# Patient Record
Sex: MALE | Race: WHITE | NOT HISPANIC OR LATINO | Employment: OTHER | URBAN - METROPOLITAN AREA
[De-identification: names, ages, dates, MRNs, and addresses within clinical notes are randomized per-mention and may not be internally consistent; named-entity substitution may affect disease eponyms.]

---

## 2017-01-16 DIAGNOSIS — E53.8 DEFICIENCY OF OTHER SPECIFIED B GROUP VITAMINS: ICD-10-CM

## 2017-01-16 DIAGNOSIS — E03.9 HYPOTHYROIDISM: ICD-10-CM

## 2017-02-27 ENCOUNTER — GENERIC CONVERSION - ENCOUNTER (OUTPATIENT)
Dept: OTHER | Facility: OTHER | Age: 82
End: 2017-02-27

## 2017-09-27 ENCOUNTER — GENERIC CONVERSION - ENCOUNTER (OUTPATIENT)
Dept: OTHER | Facility: OTHER | Age: 82
End: 2017-09-27

## 2017-12-07 ENCOUNTER — ALLSCRIPTS OFFICE VISIT (OUTPATIENT)
Dept: OTHER | Facility: OTHER | Age: 82
End: 2017-12-07

## 2017-12-07 DIAGNOSIS — R09.89 OTHER SPECIFIED SYMPTOMS AND SIGNS INVOLVING THE CIRCULATORY AND RESPIRATORY SYSTEMS: ICD-10-CM

## 2017-12-07 DIAGNOSIS — I10 ESSENTIAL (PRIMARY) HYPERTENSION: ICD-10-CM

## 2017-12-07 DIAGNOSIS — I65.29 OCCLUSION AND STENOSIS OF UNSPECIFIED CAROTID ARTERY: ICD-10-CM

## 2017-12-07 DIAGNOSIS — R56.9 CONVULSIONS (HCC): ICD-10-CM

## 2017-12-07 DIAGNOSIS — M83.5 OTHER DRUG-INDUCED OSTEOMALACIA IN ADULTS: ICD-10-CM

## 2017-12-07 DIAGNOSIS — E55.9 VITAMIN D DEFICIENCY: ICD-10-CM

## 2017-12-11 NOTE — PROGRESS NOTES
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) (M83 5,T42 75XA)   2  Vitamin D deficiency (268 9) (E55 9)   3  Generalized convulsive seizure (780 39) (R56 9)   4  Left carotid bruit (785 9) (R09 89)   5  Occlusion and stenosis of carotid artery (433 10) (I65 29)    Plan  Anticonvulsant drug-induced osteomalacia, Generalized convulsive seizure,Hypertension, Vitamin D deficiency    · (1) LIPID PANEL FASTING W DIRECT LDL REFLEX; Status:Active; Requestedfor:29Xli6853;    Perform:[facility]; DRR:81FBH5942; Ordered;drug-induced osteomalacia, Generalized convulsive seizure, Hypertension, Vitamin D deficiency; Ordered By:Evens Carr; Anticonvulsant drug-induced osteomalacia, Generalized convulsive seizure, Vitamin Ddeficiency    · (1) CBC/ PLT (NO DIFF); Status:Active; Requested for:07Dec2017;    Perform:[facility]; NDO:[id number]; Ordered; For:Anticonvulsant drug-induced osteomalacia, Generalized convulsive seizure, Vitamin D deficiency; Ordered By:Evens Carr;   · (1) COMPREHENSIVE METABOLIC PANEL; Status:Active; Requested for:26Shm3976;    Perform:[facility]; AXH:89ZPV5482; Ordered; For:Anticonvulsant drug-induced osteomalacia, Generalized convulsive seizure, Vitamin D deficiency; Ordered By:Evens Carr;   · (1) PHENOBARBITAL; Status:Active; Requested for:99Cvn2316;    Perform:[facility]; PQD:68UHY9946; Ordered; For:Anticonvulsant drug-induced osteomalacia, Generalized convulsive seizure, Vitamin D deficiency; Ordered By:Evens Carr;   · (1) VITAMIN D 25-HYDROXY; Status:Active; Requested for:72Dtq9058;    Perform:[facility]; JVQ:48ICF7882; Ordered;drug-induced osteomalacia, Generalized convulsive seizure, Vitamin D deficiency; Ordered By:Evens Carr;   · Follow-up visit in 1 year Evaluation and Treatment  Follow-up  Status: Complete  Done:34Phd2917   Ordered; For: Anticonvulsant drug-induced osteomalacia, Generalized convulsive seizure, Vitamin D deficiency; Ordered By: Nallely Estrella Performed:  Due: 87UKQ7581; Last Updated By: Shirley Dolan; 12/7/2017 3:13:17 PM  Generalized convulsive seizure    · PHENobarbital 30 MG Oral Tablet; take 3 tablets by mouth at bedtime   Rx By: Nallely Estrella; Dispense: 90 Days ; #:270 Tablet; Refill: 1;For: Generalized convulsive seizure; DALIA = N; Print Rx  Generalized convulsive seizure, Headache    · Topiramate 25 MG Oral Tablet; TAKE 2 TABLET Bedtime   Rx By: Nallely Estrella; Dispense: 90 Days ; #:180 Tablet; Refill: 3;Generalized convulsive seizure, Headache; DALIA = N; Print Rx  Left carotid bruit, Occlusion and stenosis of carotid artery    · VAS CAROTID COMPLETE STUDY; SIDE:Bilateral; Status:Active; Requestedfor:82Vdx8981;    Perform:[facility]; Order Comments:81year old man with left carotid bruit with significant stenosis  ; JET:81RXY1821; Last Updated Alex Gorman; 12/7/2017 3:13:17 PM;Ordered;carotid bruit, Occlusion and stenosis of carotid artery; Ordered By:Gregory Carr;    Discussion/Summary  Discussion Summary:   Mr Andrew Galeas is a 81year old man who experienced a single life time seizure in an acute setting, but unclear circumstances that resulted in his seizure if there were any toxic exposure or provoking factor  Apparently, he was disoriented and agitated that required him to be in restraints  He was started on phenobarbital possibly due to the risk of recurrent seizure but I have no clear evidence or information that would predispose Mr Scarlett Sam to recurrent seizures  Mr Scarlett Sam has been very resistant to getting further medical testing done to clarify his risk of seizure recurrence or assist in the diagnosis of epilepsy, partly due to unrealistic fears of complications from the testing equipment  He also argues that he would refuse to wean off or discontinue medications out of the fear of having another seizure   Even though, his risk of recurrent seizure is less than 35% that is an unacceptable risk to him  Given that he has been on phenobarbital and topiramate for the past 10 years he feels that the side effects are tolerable and that he does not worry about the possibility of chronic effects on cognition, liver function, blood cells, and bone health  He declines on DEXA scan to assess for the health of his bone as he wants to stay on phenobarbital  I cannot recommend other antiepileptic mediations without knowing his risk of seizure recurrence  recommend against abruptly stopping his antiepileptic medication especially phenobarbital as it has a high risk of withdrawal related seizure  He knows the risk of bone health and has been taking vitamin D supplementation  recommend cerebrovascular risk reduction with aspirin, hypertension management to less than 130/90, LDL reduction to less than 70, and management of diabetes if present  He should continue with his antihypertensive medications, he may benefit from a cholesterol lowering agent, he will need to maintain an active lifestyle  Due to the presence of bilateral carotid bruits, I am recommending re-assessment of right carotid stenosis (which was previously estimated to be 70%), current he is asymptomatic   - continue with phenobarbital 90mg at bedtime- continue topiramate 50mg at bedtime- check phenobarbital level, vitamin D, CBC, CMP, fasting lipid profile- continue with ASA 81mg, see PCP about blood pressure and LDL risk profile  - continue with vitamin D supplementation 5000unit/mL take 1 mL daily- bilateral carotid dopplers for carotid bruits (will communicate findings by phone, if significantly stenotic will refer to vascular surgeon)  - follow-up in 12 months  you follow-up with your PCP in regards to your blood pressure and hypothyroidism  Counseling Documentation With Imm: The patient was counseled regarding impressions  The total amount of time spent with the patient was 42 minutes   More than 50% of this time was devoted to counseling and coordination of care  Issues addressed during this clinic visit included overall management, medication counseling or monitoring (including adverse effects, side effects and risks of antiepileptic medications) and stroke symptoms and actions to take for acute neurological deficits  Start: 2:18PM, Stop 3PM      Chief Complaint  Chief Complaint Free Text Note Form: Patient present for follow up appointment regarding medication refill to prevent seizures      History of Present Illness  Mr Asiya Colón is a 81year old right handed man here for follow-up evaluation of a single seizure, uncertain diagnosis of epilepsy and refill for phenobarbital and topiramate  Interval history 12/7/2017 Mr Augustine reports no issues; no seizure, episode of loss of awareness, or loss of consciousness  He has a headache every once in a while, mostly when people aggravates him  He continues to take vitamin D supplementation  He request checking vitamin D level, drug levels, and âroutine blood workâ  He was on Ramipril but has not had a refill for a long time  He was on his wifeâs insurance but she lost her job and they lost insurance so they did not have routine doctor visits  He continues to decline medical testing  AED/side effects/compliance: Phenobarbital 30mg 3 tablets at bedtime Topiramate 50mg at bedtime No side effects No missed doses HPI: Initial History 11/7/2014Davide has had only one lifetime seizure around 2007  He is unable to give me exact details that resulted in the seizure  Apparently, prior to having the seizure he was feeling very bad/weak, his pulmonologist had him go to the hospital and was told that he was severely anemic  When he was in the hospital, he âwent nutsâ, he needed to be put in restraints, he was hospitalized for a week, he even had a lumbar puncture  He was told that his B12 was low and required monthly injections   He never had an idea why he cannot absorb B12  On the day that he was being discharged he had a seizure, his ankles were also swollen, but he does not remember the seizure  He also had severe headaches after the seizure  He never had headaches before the hospitalization  He was in [facility] in 2007  He was immediately started on phenobarbital for his seizure and topiramate for his headaches  Since then he has not had another seizure  was taking Xanax for anxiety, started after his seizure, in 2007  His last alcohol drink was 20 â 30 years ago  He has no prior history of seizures or events of confusion, zoning out, behavioral arrest   has difficulty falling asleep, toss and turn, and takes temazepam 30mg but drug plan reduced it to 7 5mg for sleep  Princess Galvin had requested an EEG and MRI brain study to verify Mr Boateng underlying cause of seizure  However, Mr Maria Del Rosario Pedro refused to undergo these studies  He felt like he was held hostage by not being able to get his medication refilled  His primary care doctor did not want to refill the medication unless he was evaluated by his neurologist  Dr Princess Galvin wanted Mr Maria Del Rosario Pedro evaluated by a specialist before any more refills will be given  Mr Maria Del Rosario Pedro is concerned about having an EEG due to a friend of his who went nuts after apparently having an EEG study  He states that he has had an MRI study of another body region in the past   of 2015-2016[place] continues to have a fear of medical tests including EEGs and MRI with dye; refuses all testing even DEXA  He is afraid that the EEG will cause him to have a seizure or take over his body  He is petrified of any electrical equipment on his head  He has not had a seizure since 2007  denies myoclonic jerking, altered mental status, disorient/confusion, convulsion, epigastric rising, or impending doom sensations  There are no headaches ever since he was started on topiramate  He has been taking vitamin D3 supplements   He feels that he is doing well and if it aidi't broke don't fix it   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 he is on antiepileptic medications he cannot get a CDL license so he put it on hold  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  He intermittently get anxiety attacks when he gets worked up     semiology:Single seizure that he cannot remember details possibly a convulsive seizure  Featuresepilepticus: NoInjury Seizures: NoFactors: None  Risk Factors:pregnancy: Nobirth/: NoDevelopment: Noseizures, simple: Noseizures, complex: Noinfection: Noretardation: Nopalsy: Noinjury (moderate/severe): Noneoplasm: Nomalformation: Noprocedure: NoNoabuse: Noabuse: Nohistory Sz/epilepsy: No  AEDs:     Review of Systems  A review of 12 organ systems was completed and all systems were negative except for what is detailed in the HPI and noted below Neurological ROS:  HEENT: dryness of the eyes-- and-- hearing loss  Respiratory: shortness of breath with or without exertion  Psychiatric: anxiety  Active Problems  1  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,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) (M83 5,T42 75XA)   2  Cataract of both eyes (366 9) (H26 9)   3  COPD exacerbation (491 21) (J44 1)   4  Generalized convulsive seizure (780 39) (R56 9)   5  GERD (gastroesophageal reflux disease) (530 81) (K21 9)   6  Headache (784 0) (R51)   7  Hypertension (401 9) (I10)   8  Hypothyroidism (244 9) (E03 9)   9  Insomnia (780 52) (G47 00)   10  Left carotid bruit (785 9) (R09 89)   11  Occlusion and stenosis of carotid artery (433 10) (I65 29)   12  Vitamin B12 deficiency (266 2) (E53 8)   13  Vitamin D deficiency (268 9) (E55 9)    Past Medical History  1  History of Anxiety (300 00) (F41 9)   2  History of High blood pressure (401 9) (I10)   3  History of Lung disease (518 89) (J98 4)   4  History of Thyroid disorder (246 9) (E07 9)  Active Problems And Past Medical History Reviewed:    The active problems and past medical history were reviewed and updated today  Surgical History    1  History of Hernia Repair   2  History of Tonsillectomy   3  History of Transurethral Resection Of Prostate (TURP)    Family History  Mother    1  Family history of Benign essential hypertension  Father    2  Family history of Pacemaker Placement   3  Family history of Peripheral arterial disease  Family History Reviewed: The family history was reviewed and updated today  There is no family history of seizure, epilepsy or developmental delay  Father may have been demented      Social History     · Former smoker (V14 24) (W01 741)   ·    · No alcohol use   · No drug use   · Retired  Social History Reviewed: The social history was reviewed and is unchanged  Living situation:  lives with wife Tobacco:  stopped smoking 20-30 years ago  Alcohol:  No alcohol use for the 30 years ago Drugs:  No illegal drug use Work:  retired, , 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 Driving:  Yes      Current Meds   1  AmLODIPine Besylate 5 MG Oral Tablet; take 1 tablet by mouth daily; Therapy: 53Jui5921 to (Evaluate:10Nov2017)  Requested for: 73URK8143; Last Rx:12Ctk2919 Ordered   2  Aspirin 81 MG TABS; TAKE 1 TABLET DAILY; Therapy: (Recorded:07Nov2014) to Recorded   3  Cyanocobalamin 1000 MCG/ML Injection Solution; INJECT 1 ML INTRAMUSCULARLY ONCE A MONTH; Therapy: 67YJS6428 to (Evaluate:15Mar2017)  Requested for: 87HVY5045; Last Rx:91Cvs3319 Ordered   4  Flonase 50 MCG/ACT SUSP; USE 1 SPRAY IN EACH NOSTRIL ONCE DAILY; Therapy: (Recorded:07Nov2014) to Recorded   5  Levothyroxine Sodium 88 MCG Oral Tablet; take 1 tablet every day; Therapy: 07Xzk2434 to (Dhara Miranda)  Requested for: 04AIY6156; Last Rx:68Ncf9567 Ordered   6  Omeprazole 20 MG Oral Capsule Delayed Release; TAKE ONE CAPSULE BY MOUTH TWICE A DAY; Therapy: 38Uhn3785 to (Evaluate:10Nov2017)  Requested for: 81HME8328; Last Rx:15Qot6123 Ordered   7   PHENobarbital 30 MG Oral Tablet; take 3 tablets by mouth at bedtime; Therapy: 35Eop3357 to (Renew:98Lvr9023)  Requested for: 15Csd2538; Last Rx:35Xud3514 Ordered   8  Ramipril 10 MG Oral Capsule; TAKE ONE CAPSULE BY MOUTH EVERY DAY; Therapy: 63OCG1655 to (Evaluate:42Htb3200)  Requested for: 10KKB7328; Last Rx:67Esd4968 Ordered   9  Topiramate 25 MG Oral Tablet; TAKE 2 TABLET Bedtime; Therapy: 19TQD0055 to (Evaluate:87Iuy2382); Last Rx:79Jmw1143 Ordered   10  Ventolin  (90 Base) MCG/ACT Inhalation Aerosol Solution; Therapy: 86Sfr4580 to (Evaluate:44Zsn8145) Recorded   11  Vitamin D3 5000 UNIT/ML Oral Liquid; TAKE 1 ML Daily; Therapy: 71GSP9341 to (Last Rx:69Odm3746) Ordered  Medication List Reviewed: The medication list was reviewed and updated today  Allergies    1  No Known Drug Allergies    Vitals  Signs   Recorded: 21EUJ6305 02:10PM   Heart Rate: 84  Systolic: 753, LUE, Sitting  Diastolic: 78, LUE, Sitting  Height: 5 ft 8 in  Weight: 123 lb 2 oz  BMI Calculated: 18 72  BSA Calculated: 1 66  O2 Saturation: 97    Physical Exam  GENERAL:  normally developed person in no acute distress, atraumatic head, he has an extensive white beard with rubber bands ties Carotids: bilateral bruits are heard Eyes: Anicteric Heart: Regular rate and rhythm Chest: clear to auscultation  MENTAL STATUS Orientation: Alert and oriented x 3 Fund of knowledge: Intact  Attention/concentration: Intact Recent/remote memory: Intact Language: speech is fluent, no aphasia  OPHTHALMOSCOPIC Fundus/Optic discs/Posterior segments: N/a  CRANIAL NERVES II: PERRL  III, IV, VI: Extraocular movements intact  No nystagmus  V: N/A VII: Facial movements normal and symmetric VIII: n/a IX, X: no dysarthria XI: Intact trapezius, SCM strength XII: Tongue protrudes to the midline  MOTOR (Upper and lower extremities)  Bulk/tone/abnormal movement: Normal muscle bulk and tone  Drift: No pronator drift  Strength: Strength 5/5 throughout      COORDINATION  F/N: normal FFM: normal CEZAR: normal Station/Gait: Normal  SENSORY Light touch: normal  Reflexes: n/a      Results/Data  Diagnostic Studies Reviewed:  MRI Review Refuses to have MRI brain completedto have EEG completedto have DEXA scan completed  Ultrasound 10/29/2014dopplersâ moderate to sever atherosclerotic plaque in the right bulbâ mild calcific plaquing in the left ICAvertebral arteries show antegrade flow  Diagnostic Review Labs:141/3 9/106/26/21/0 53/837 1/3 8/0 2/16/19/519640 074112 4/13 7/42/252    D 3413 5        Signatures   Electronically signed by : MANI Sheehan ; Dec 10 2017  5:14PM EST                       (Author)

## 2018-01-17 NOTE — RESULT NOTES
Verified Results  *VB - Urinary Incontinence Screen (Dx Z13 89 Screen for UI) 15ZNQ7443 03:03PM Ad Capes     Test Name Result Flag Reference   Urinary Incontinence Assessment      pt has no urinary incontinence                       *VB - Fall Risk Assessment  (Dx Z13 89 Screen for Neurologic Disorder) 38GVN0444 03:03PM Ad Capes     Test Name Result Flag Reference   Falls Risk Not Done      Patient not ambulatory

## 2018-01-22 VITALS
HEIGHT: 68 IN | OXYGEN SATURATION: 97 % | DIASTOLIC BLOOD PRESSURE: 78 MMHG | WEIGHT: 123.13 LBS | HEART RATE: 84 BPM | SYSTOLIC BLOOD PRESSURE: 128 MMHG | BODY MASS INDEX: 18.66 KG/M2

## 2018-08-29 DIAGNOSIS — R56.9 GENERALIZED CONVULSIVE SEIZURE (HCC): Primary | ICD-10-CM

## 2018-08-29 RX ORDER — PHENOBARBITAL 30 MG/1
90 TABLET ORAL
Qty: 270 TABLET | Refills: 1 | Status: SHIPPED | OUTPATIENT
Start: 2018-08-29 | End: 2018-12-13 | Stop reason: SDUPTHER

## 2018-08-29 RX ORDER — TOPIRAMATE 25 MG/1
2 TABLET ORAL
COMMUNITY
Start: 2016-12-06 | End: 2018-08-29 | Stop reason: SDUPTHER

## 2018-08-29 RX ORDER — PHENOBARBITAL 30 MG/1
3 TABLET ORAL
COMMUNITY
Start: 2017-08-30 | End: 2018-08-29 | Stop reason: SDUPTHER

## 2018-08-29 RX ORDER — TOPIRAMATE 25 MG/1
50 TABLET ORAL
Qty: 180 TABLET | Refills: 3 | Status: SHIPPED | OUTPATIENT
Start: 2018-08-29 | End: 2018-12-13 | Stop reason: SDUPTHER

## 2018-08-29 NOTE — TELEPHONE ENCOUNTER
Pt requests that scripts be printed and mailed to him as he sends to a mail order      Please mail when signed

## 2018-12-13 ENCOUNTER — OFFICE VISIT (OUTPATIENT)
Dept: NEUROLOGY | Facility: CLINIC | Age: 83
End: 2018-12-13
Payer: MEDICARE

## 2018-12-13 ENCOUNTER — TELEPHONE (OUTPATIENT)
Dept: NEUROLOGY | Facility: CLINIC | Age: 83
End: 2018-12-13

## 2018-12-13 VITALS
DIASTOLIC BLOOD PRESSURE: 78 MMHG | WEIGHT: 118.1 LBS | HEIGHT: 68 IN | BODY MASS INDEX: 17.9 KG/M2 | HEART RATE: 66 BPM | SYSTOLIC BLOOD PRESSURE: 126 MMHG

## 2018-12-13 DIAGNOSIS — T42.75XA ANTICONVULSANT DRUG-INDUCED OSTEOMALACIA: ICD-10-CM

## 2018-12-13 DIAGNOSIS — I65.23 CAROTID ARTERY STENOSIS, ASYMPTOMATIC, BILATERAL: ICD-10-CM

## 2018-12-13 DIAGNOSIS — R51.9 NONINTRACTABLE EPISODIC HEADACHE, UNSPECIFIED HEADACHE TYPE: ICD-10-CM

## 2018-12-13 DIAGNOSIS — M83.5 ANTICONVULSANT DRUG-INDUCED OSTEOMALACIA: ICD-10-CM

## 2018-12-13 DIAGNOSIS — R56.9 GENERALIZED CONVULSIVE SEIZURE (HCC): Primary | ICD-10-CM

## 2018-12-13 PROBLEM — J43.1 PANLOBULAR EMPHYSEMA (HCC): Status: ACTIVE | Noted: 2017-12-21

## 2018-12-13 PROCEDURE — 99214 OFFICE O/P EST MOD 30 MIN: CPT | Performed by: PSYCHIATRY & NEUROLOGY

## 2018-12-13 RX ORDER — FLUTICASONE PROPIONATE 50 MCG
SPRAY, SUSPENSION (ML) NASAL
COMMUNITY
Start: 2015-10-23

## 2018-12-13 RX ORDER — INFLUENZA A VIRUSA/MICHIGAN/45/2015 X-275 (H1N1) ANTIGEN (FORMALDEHYDE INACTIVATED), INFLUENZA A VIRUS A/HONG KONG/4801/2014 X-263B (H3N2) ANTIGEN (FORMALDEHYDE INACTIVATED), AND INFLUENZA B VIRUS B/BRISBANE/60/2008 ANTIGEN (FORMALDEHYDE INACTIVATED) 60; 60; 60 UG/.5ML; UG/.5ML; UG/.5ML
INJECTION, SUSPENSION INTRAMUSCULAR
Refills: 0 | COMMUNITY
Start: 2018-10-11

## 2018-12-13 RX ORDER — LEVOTHYROXINE SODIUM 88 UG/1
1 TABLET ORAL DAILY
COMMUNITY
Start: 2014-09-18

## 2018-12-13 RX ORDER — RAMIPRIL 10 MG/1
1 CAPSULE ORAL DAILY
COMMUNITY
Start: 2014-10-09

## 2018-12-13 RX ORDER — TOPIRAMATE 25 MG/1
50 TABLET ORAL
Qty: 180 TABLET | Refills: 3 | Status: SHIPPED | OUTPATIENT
Start: 2018-12-13 | End: 2019-08-28 | Stop reason: SDUPTHER

## 2018-12-13 RX ORDER — AMLODIPINE BESYLATE 5 MG/1
1 TABLET ORAL DAILY
COMMUNITY
Start: 2014-09-17

## 2018-12-13 RX ORDER — PHENOBARBITAL 30 MG/1
90 TABLET ORAL
Qty: 270 TABLET | Refills: 3 | Status: SHIPPED | OUTPATIENT
Start: 2018-12-13 | End: 2019-03-03 | Stop reason: SDUPTHER

## 2018-12-13 RX ORDER — ALBUTEROL SULFATE 90 UG/1
4 AEROSOL, METERED RESPIRATORY (INHALATION) EVERY 6 HOURS PRN
COMMUNITY
Start: 2014-04-25

## 2018-12-13 NOTE — PROGRESS NOTES
Review of Systems   Constitutional: Negative  Negative for appetite change and fever  HENT: Positive for hearing loss  Negative for tinnitus, trouble swallowing and voice change  Eyes: Negative  Negative for photophobia and pain  Respiratory: Negative  Negative for shortness of breath  Cardiovascular: Negative  Negative for palpitations  Gastrointestinal: Positive for abdominal pain  Negative for nausea and vomiting  Endocrine: Negative  Negative for cold intolerance and heat intolerance  Genitourinary: Negative  Negative for dysuria, frequency and urgency  Musculoskeletal: Positive for myalgias (due to hernia)  Negative for neck pain  Skin: Negative  Negative for rash  Neurological: Negative  Negative for dizziness, tremors, seizures, syncope, facial asymmetry, speech difficulty, weakness, light-headedness, numbness and headaches  Hematological: Negative  Does not bruise/bleed easily  Psychiatric/Behavioral: Negative  Negative for confusion, hallucinations and sleep disturbance

## 2018-12-13 NOTE — LETTER
2018     Adina Healy MD  [address]  [address]    Patient: Jaun Duckworth   YOB: 1935   Date of Visit: 2018       Dear Dr Bel Barrios: Thank you for referring Haley Campo to me for evaluation  Below are my notes for this consultation  If you have questions, please do not hesitate to call me  I look forward to following your patient along with you  Sincerely,        Sully Gomez MD        CC: No Recipients  Sully Gomez MD  2018  3:45 PM  Sign at close encounter  [facility]  Patient's Name: Jaun Duckworth   Patient's : 1935   Visit Type: follow-up  Referring MD / PCP:  Adina Healy MD     Assessment:  Mr Haley Campo is a 81 y o  man who experienced a single life time seizure in an acute setting, but unclear circumstances that resulted in his seizure if there were any toxic exposure or provoking factor  It was severe enough that he was started on phenobarbital   He has refused to undergo medical testing to determine if he has an inherent risk for recurrent seizures (EEG and MRI brain study)  He also is concerned that should he stop phenobarbital that he will experience a recurrent seizure  He cannot afford to lose his 's license [place] and needs to be able to transport his wife for dialysis  We agreed to continue with phenobarbital to prevent seizures (even though an inherent risk is not clearly defined)  He continues with topiramate because he had intractable migraines at some point and that topiramate has prevented recurrence of migraine headaches  He is aware of the chronic side effects of these medications including osteopenia/osteomalacia, he has refused DEXA scanning and blood work      Mr Claude Meyer has been very resistant to getting further medical testing done to clarify his risk of seizure recurrence or assist in the diagnosis of epilepsy, partly due to unrealistic fears of complications from the testing equipment  He also argues that he would refuse to wean off or discontinue medications out of the fear of having another seizure  Even though, his risk of recurrent seizure is less than 35% that is an unacceptable risk to him  I cannot recommend other antiepileptic mediations without knowing his risk of seizure recurrence  I recommend against abruptly stopping his antiepileptic medication especially phenobarbital as it has a high risk of withdrawal related seizure  He knows the risk of bone health and has been taking vitamin D supplementation  Due to the presence of bilateral carotid bruits, I am recommending re-assessment of right carotid stenosis (which was previously estimated to be 70%), current he is asymptomatic  Plan:   - continue with phenobarbital 90mg at bedtime  - continue topiramate 50mg at bedtime  - check phenobarbital level, vitamin D, CBC, CMP, fasting lipid profile  - continue with ASA 81mg, see PCP about blood pressure and LDL risk profile  - continue with vitamin D supplementation 5000unit/mL take 1 mL daily  - bilateral carotid dopplers for carotid bruits (will communicate findings by phone, if significantly stenotic will refer to vascular surgeon)  - follow-up in 12 months    Problem List Items Addressed This Visit        Cardiovascular and Mediastinum    Carotid artery stenosis, asymptomatic, bilateral       Musculoskeletal and Integument    Anticonvulsant drug-induced osteomalacia       Other    Generalized convulsive seizure ([facility] ) - Primary    Headache        Chief Complaint:    Chief Complaint   Patient presents with    Seizures        HPI:    Paul Fenton is a 81 y o  right handed male here for follow-up evaluation of unclear diagnosis of underlying seizure disorder  Interval History 12/13/2018  He reports feeling well, there have been no recurrent seizures, no convulsion, or unusual behavior    He denies recurrent headaches  He misplaced his requisition for blood work that was ordered a year ago  He has to drive his wife to her dialysis and does not want to change is medication because he does not want to risk a seizure and lose his 's license  He has no social security, no pension, and no other way of getting his wife to dialysis without him being able to drive  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, just local area, job site to job site, transporting "earth movers"  He has a federal and state CDL  He has COPD  AED/side effects/compliance:  Phenobarbital 30mg 3 tablets at bedtime  Topiramate 50mg at bedtime  No side effects  No missed doses    Seizure semiology:  1  Single seizure that he cannot remember details possibly a convulsive seizure    Prior Epilepsy History:  Intake history 11/7/2014  Mr Augustine has had only one lifetime seizure around 2007  He is unable to give me exact details that resulted in the seizure  Apparently, prior to having the seizure he was feeling very bad/weak, his pulmonologist had him go to the hospital and was told that he was severely anemic  When he was in the hospital, he went nuts, he needed to be put in restraints, he was hospitalized for a week, he even had a lumbar puncture  He was told that his B12 was low and required monthly injections  He never had an idea why he cannot absorb B12  On the day that he was being discharged he had a seizure, his ankles were also swollen, but he does not remember the seizure  He also had severe headaches after the seizure  He never had headaches before the hospitalization  He was in [facility] in 2007  He was immediately started on phenobarbital for his seizure and topiramate for his headaches  Since then he has not had another seizure  He also takes Xanax for anxiety  He had started Xanax after his seizure in 2007  Currently, he is taking Xanax about 1-2 times a day but not every single day for anxiety  His last alcohol drink was 20  30 years ago  He has no prior history of seizures or events of confusion, zoning out, behavioral arrest     He has difficulty falling asleep, toss and turn, and takes temazepam 30mg but drug plan reduced it to 7 5mg for sleep  Dr Ivan Delgadillo had requested an EEG and MRI brain study to verify Mr Elton Currie underlying cause of seizure  However, Mr Mis Fink refused to undergo these studies  He felt like he was held hostage by not being able to get his medication refilled  His primary care doctor did not want to refill the medication unless he was evaluated by his neurologist   Dr Ivan Delgadillo wanted Mr Mis Fink evaluated by a specialist before any more refills will be given  Mr Mis Fink is concerned about having an EEG due to a friend of his who went nuts after apparently having an EEG study  He states that he has had an MRI study of another body region in the past     Mr Mis Fink depends on his [email or web address]  license to support his income  Summary [id number]  Mr Augustine continues to have a fear of medical tests including EEGs and MRI with dye  He is afraid that the EEG will cause him to have a seizure or take over his body  He is petrified of any electrical equipment on his head  He does not even want to have a DEXA scan  He denies myoclonic jerking, altered mental status, disorient/confusion, convulsion, epigastric rising, or impending doom sensations  There are no headaches ever since he was started on topiramate  He is too concerned about having a seizure and wishes to stay on both phenobarbital and topiramate, concerned that by weaning off of these medications he will have a seizure  His CDL license is on hold, he is not driving his tractor trailer because of the rule about not being on antiseizure medication      Special Features  Status epilepticus: No  Self Injury Seizures: No  Precipitating Factors: None    Epilepsy Risk Factors:  Abnormal pregnancy: No  Abnormal birth/: No  Abnormal Development: No  Febrile seizures, simple: No  Febrile seizures, complex: No  CNS infection: No  Mental retardation: No  Cerebral palsy: No  Head injury (moderate/severe): No  CNS neoplasm: No  CNS malformation: No  Neurosurgical procedure: No  Stroke: No  Alcohol abuse: No  Drug abuse: No  Family history Sz/epilepsy: No    Prior AEDs:  medication Max dose Time used Reason to stop   phenobarbital      topiramate        Prior workup:  x   Imaging:  He refuses brain imaging study    9/13/2016  [facility] - Carotid duplex study  50-69% right ICA artery stenosis  20-49% left ICA stenosis  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    EEGs:  He refuses EEG study    Labs:  No labs available for last 2 years      General exam   /78 (BP Location: Left arm, Patient Position: Sitting, Cuff Size: Adult)   Pulse 66   Ht 5' 8" (1 727 m)   Wt 53 6 kg (118 lb 1 6 oz)   BMI 17 96 kg/m²     Appearance: elderly man, normally developed, there is temporalis muscle atrophy, he coughs frequently during the office visit  Carotids: left carotid bruit is present  Cardiovascular: difficult to hear heart sounds  Pulmonary: clear to auscultation    HEENT: anicteric and moist mucus membranes / oral cavity   Fundoscopy: not assessed    Mental status  Orientation: alert and oriented to name, place, time  Fund of Knowledge: intact   Attention and Concentration: able to spell HOUSE forwards and backwards  Current and Remote Memory:intact  Language: spontaneous speech is normal and comprehension is intact    Cranial Nerves  CN 1: not tested  CN 2: not assessed   CN 3, 4, 6: EOMI, no nystagmus  CN 5:not assessed  CN 7:muscles of facial expression are symmetric  CN 8:not assessed  CN 9, 10:no dysarthria present  CN 11:symmetric strength of sternocleidomastoid and trapezius muscles  CN 12:tongue is midline    Motor:  Bulk, Tone: normal bulk, normal tone  Pronation: no pronator drift  Strength: Symmetric strength of the arms and legs, no lateralizing weakness     Sensory:  Lighttouch: intact in all limbs  Romberg:not assessed    Coordination:  FNF:FNF bilaterally intact  CEZAR:incoordinated finger movements of the left and incoordinated finger movements of the right  FFM:intact  Gait/Station:normal gait    Reflexes:  not assessed    Past Medical/Surgical History:  Patient Active Problem List   Diagnosis    Anticonvulsant drug-induced osteomalacia    Generalized convulsive seizure ([facility] [address] )    Cataract of both eyes    COPD exacerbation ([facility] )    GERD (gastroesophageal reflux disease)    Headache    Hypertension    Hypothyroidism    Insomnia    Left carotid bruit    Carotid artery stenosis, asymptomatic, bilateral    Vitamin B12 deficiency    Vitamin D deficiency    Panlobular emphysema ([facility] )     Past Medical History:   Diagnosis Date    Anxiety     [facility]     Hypertension     Lung disease     [facility]     Thyroid disorder      Past Surgical History:   Procedure Laterality Date    HERNIA REPAIR Left 2009    TONSILECTOMY AND ADNOIDECTOMY         Past Psychiatric History:  Depression: No  Anxiety: No  Psychosis: No    Medications:    Current Outpatient Prescriptions:     albuterol (VENTOLIN HFA) 90 mcg/act inhaler, Inhale 4 puffs every 6 (six) hours as needed, Disp: , Rfl:     amLODIPine (NORVASC) 5 mg tablet, Take 1 tablet by mouth daily, Disp: , Rfl:     aspirin 81 MG tablet, Take 1 tablet by mouth daily, Disp: , Rfl:     Cholecalciferol (VITAMIN D3) 5000 UNIT/ML LIQD, Take 1 mL by mouth daily, Disp: , Rfl:     Cyanocobalamin 1000 MCG/ML KIT, Inject as directed, Disp: , Rfl:     fluticasone (FLONASE) 50 mcg/act nasal spray, 2 Sprays in each nostril once a day, Disp: , Rfl:     FLUZONE HIGH-DOSE 0 5 ML DARSHANA, Use as directed, Disp: , Rfl: 0    levothyroxine 88 mcg tablet, Take 1 tablet by mouth daily, Disp: , Rfl:     mometasone-formoterol (DULERA) 200-5 MCG/ACT inhaler, Use as directed, Disp: , Rfl:     PHENobarbital 30 mg tablet, Take 3 tablets (90 mg total) by mouth daily at bedtime, Disp: 270 tablet, Rfl: 1    ramipril (ALTACE) 10 MG capsule, Take 1 capsule by mouth daily, Disp: , Rfl:     topiramate (TOPAMAX) 25 mg tablet, Take 2 tablets (50 mg total) by mouth daily at bedtime, Disp: 180 tablet, Rfl: 3    Allergies: Allergies   Allergen Reactions    Pollen Extract        Family history:  Family History   Problem Relation Age of Onset    Other Father         pacemaker    Hypertension Mother     Hypertension Brother      There is no family history of seizure, epilepsy or developmental delay  Father may have been demented    Social History  Living situation:  lives with wife  Tobacco:  stopped smoking 20-30 years ago   Alcohol:  No alcohol use for the 30 years ago  Drugs:  No illegal drug use  Work:  retired, , 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  Driving:  Yes   reports that he has quit smoking  He has never used smokeless tobacco  He reports that he does not drink alcohol or use drugs  Review of Systems  A review of at least 12 organ/systems was obtained by the medical assistant and reviewed by me, including additional positives/negatives: HENT: Positive for hearing loss  Negative for tinnitus, trouble swallowing and voice change  Gastrointestinal: Positive for abdominal pain  Negative for nausea and vomiting  Musculoskeletal: Positive for myalgias (due to hernia)  Negative for neck pain  Decision making was of high-complexity due to the patient's high risk condition (seizures), psychiatric and neuropsychological comorbidities, behavioral problems, memory and cognitive problems and medication side effects  The total amount of time spent with the patient was 35 minutes  More than 50% of this time was devoted to counseling and coordination of care   Issues addressed during this clinic visit included overall management, medication counseling or monitoring (including adverse effects, side effects and risks of antiepileptic medications)     Start time: 2:20PM  End time: 2:55PM

## 2018-12-13 NOTE — PROGRESS NOTES
[address] [facility]  Patient's Name: Jeremy Evans   Patient's : 1935   Visit Type: follow-up  Referring MD / PCP:  Elray Lesch, MD     Assessment:  Mr Silva Kamara is a 81 y o  man who experienced a single life time seizure in an acute setting, but unclear circumstances that resulted in his seizure if there were any toxic exposure or provoking factor  It was severe enough that he was started on phenobarbital   He has refused to undergo medical testing to determine if he has an inherent risk for recurrent seizures (EEG and MRI brain study)  He also is concerned that should he stop phenobarbital that he will experience a recurrent seizure  He cannot afford to lose his 's license [place] and needs to be able to transport his wife for dialysis  We agreed to continue with phenobarbital to prevent seizures (even though an inherent risk is not clearly defined)  He continues with topiramate because he had intractable migraines at some point and that topiramate has prevented recurrence of migraine headaches  He is aware of the chronic side effects of these medications including osteopenia/osteomalacia, he has refused DEXA scanning and blood work  Mr Didier Joshua has been very resistant to getting further medical testing done to clarify his risk of seizure recurrence or assist in the diagnosis of epilepsy, partly due to unrealistic fears of complications from the testing equipment  He also argues that he would refuse to wean off or discontinue medications out of the fear of having another seizure  Even though, his risk of recurrent seizure is less than 35% that is an unacceptable risk to him  I cannot recommend other antiepileptic mediations without knowing his risk of seizure recurrence  I recommend against abruptly stopping his antiepileptic medication especially phenobarbital as it has a high risk of withdrawal related seizure   He knows the risk of bone health and has been taking vitamin D supplementation  Due to the presence of bilateral carotid bruits, I am recommending re-assessment of right carotid stenosis (which was previously estimated to be 70%), current he is asymptomatic  Plan:   - continue with phenobarbital 90mg at bedtime  - continue topiramate 50mg at bedtime  - check phenobarbital level, vitamin D, CBC, CMP, fasting lipid profile  - continue with ASA 81mg, see PCP about blood pressure and LDL risk profile  - continue with vitamin D supplementation 5000unit/mL take 1 mL daily  - bilateral carotid dopplers for carotid bruits (will communicate findings by phone, if significantly stenotic will refer to vascular surgeon)  - follow-up in 12 months    Problem List Items Addressed This Visit        Cardiovascular and Mediastinum    Carotid artery stenosis, asymptomatic, bilateral       Musculoskeletal and Integument    Anticonvulsant drug-induced osteomalacia       Other    Generalized convulsive seizure ([facility] [address] ) - Primary    Headache        Chief Complaint:    Chief Complaint   Patient presents with    Seizures        HPI:    Leontine Oppenheim is a 81 y o  right handed male here for follow-up evaluation of unclear diagnosis of underlying seizure disorder  Interval History 12/13/2018  He reports feeling well, there have been no recurrent seizures, no convulsion, or unusual behavior  He denies recurrent headaches  He misplaced his requisition for blood work that was ordered a year ago  He has to drive his wife to her dialysis and does not want to change is medication because he does not want to risk a seizure and lose his 's license  He has no social security, no pension, and no other way of getting his wife to dialysis without him being able to drive  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, just local area, job site to job site, transporting "earth movers"  He has a federal and state CDL  He has COPD      AED/side effects/compliance:  Phenobarbital 30mg 3 tablets at bedtime  Topiramate 50mg at bedtime  No side effects  No missed doses    Seizure semiology:  1  Single seizure that he cannot remember details possibly a convulsive seizure    Prior Epilepsy History:  Intake history 11/7/2014  Mr Augustine has had only one lifetime seizure around 2007  He is unable to give me exact details that resulted in the seizure  Apparently, prior to having the seizure he was feeling very bad/weak, his pulmonologist had him go to the hospital and was told that he was severely anemic  When he was in the hospital, he went nuts, he needed to be put in restraints, he was hospitalized for a week, he even had a lumbar puncture  He was told that his B12 was low and required monthly injections  He never had an idea why he cannot absorb B12  On the day that he was being discharged he had a seizure, his ankles were also swollen, but he does not remember the seizure  He also had severe headaches after the seizure  He never had headaches before the hospitalization  He was in [facility] in 2007  He was immediately started on phenobarbital for his seizure and topiramate for his headaches  Since then he has not had another seizure  He also takes Xanax for anxiety  He had started Xanax after his seizure in 2007  Currently, he is taking Xanax about 1-2 times a day but not every single day for anxiety  His last alcohol drink was 20 - 30 years ago  He has no prior history of seizures or events of confusion, zoning out, behavioral arrest     He has difficulty falling asleep, toss and turn, and takes temazepam 30mg but drug plan reduced it to 7 5mg for sleep  Dr Tara Lara had requested an EEG and MRI brain study to verify Mr Giorgio Alexander underlying cause of seizure  However, Mr Reji Slater refused to undergo these studies  He felt like he was held hostage by not being able to get his medication refilled    His primary care doctor did not want to refill the medication unless he was evaluated by his neurologist   Dr Ivan Delgadillo wanted Mr Mis Fink evaluated by a specialist before any more refills will be given  Mr Mis Fink is concerned about having an EEG due to a friend of his who went nuts after apparently having an EEG study  He states that he has had an MRI study of another body region in the past     Mr Mis Fink depends on his [employer]  license to support his income  Summary [id number]  Mr Augustine continues to have a fear of medical tests including EEGs and MRI with dye  He is afraid that the EEG will cause him to have a seizure or take over his body  He is petrified of any electrical equipment on his head  He does not even want to have a DEXA scan  He denies myoclonic jerking, altered mental status, disorient/confusion, convulsion, epigastric rising, or impending doom sensations  There are no headaches ever since he was started on topiramate  He is too concerned about having a seizure and wishes to stay on both phenobarbital and topiramate, concerned that by weaning off of these medications he will have a seizure  His [employer] license is on hold, he is not driving his tractor trailer because of the rule about not being on antiseizure medication      Special Features  Status epilepticus: No  Self Injury Seizures: No  Precipitating Factors: None    Epilepsy Risk Factors:  Abnormal pregnancy: No  Abnormal birth/: No  Abnormal Development: No  Febrile seizures, simple: No  Febrile seizures, complex: No  CNS infection: No  Mental retardation: No  Cerebral palsy: No  Head injury (moderate/severe): No  CNS neoplasm: No  CNS malformation: No  Neurosurgical procedure: No  Stroke: No  Alcohol abuse: No  Drug abuse: No  Family history Sz/epilepsy: No    Prior AEDs:  medication Max dose Time used Reason to stop   phenobarbital      topiramate        Prior workup:  x   Imaging:  He refuses brain imaging study    2016  [facility] - Carotid duplex study  50-69% right ICA artery stenosis  20-49% left ICA stenosis  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    EEGs:  He refuses EEG study    Labs:  No labs available for last 2 years      General exam   /78 (BP Location: Left arm, Patient Position: Sitting, Cuff Size: Adult)   Pulse 66   Ht 5' 8" (1 727 m)   Wt 53 6 kg (118 lb 1 6 oz)   BMI 17 96 kg/m²    Appearance: elderly man, normally developed, there is temporalis muscle atrophy, he coughs frequently during the office visit  Carotids: left carotid bruit is present  Cardiovascular: difficult to hear heart sounds  Pulmonary: clear to auscultation    HEENT: anicteric and moist mucus membranes / oral cavity   Fundoscopy: not assessed    Mental status  Orientation: alert and oriented to name, place, time  Fund of Knowledge: intact   Attention and Concentration: able to spell HOUSE forwards and backwards  Current and Remote Memory:intact  Language: spontaneous speech is normal and comprehension is intact    Cranial Nerves  CN 1: not tested  CN 2: not assessed   CN 3, 4, 6: EOMI, no nystagmus  CN 5:not assessed  CN 7:muscles of facial expression are symmetric  CN 8:not assessed  CN 9, 10:no dysarthria present  CN 11:symmetric strength of sternocleidomastoid and trapezius muscles  CN 12:tongue is midline    Motor:  Bulk, Tone: normal bulk, normal tone  Pronation: no pronator drift  Strength: Symmetric strength of the arms and legs, no lateralizing weakness     Sensory:  Lighttouch: intact in all limbs  Romberg:not assessed    Coordination:  FNF:FNF bilaterally intact  CEZAR:incoordinated finger movements of the left and incoordinated finger movements of the right  FFM:intact  Gait/Station:normal gait    Reflexes:  not assessed    Past Medical/Surgical History:  Patient Active Problem List   Diagnosis    Anticonvulsant drug-induced osteomalacia    Generalized convulsive seizure ([address] )    Cataract of both eyes    COPD exacerbation ([address] )    GERD (gastroesophageal reflux disease)    Headache    Hypertension    Hypothyroidism    Insomnia    Left carotid bruit    Carotid artery stenosis, asymptomatic, bilateral    Vitamin B12 deficiency    Vitamin D deficiency    Panlobular emphysema (HCC)     Past Medical History:   Diagnosis Date    Anxiety     COPD (chronic obstructive pulmonary disease) (HCC)     Hypertension     Lung disease     Seizures (HCC)     Thyroid disorder      Past Surgical History:   Procedure Laterality Date    HERNIA REPAIR Left 2009    TONSILECTOMY AND ADNOIDECTOMY         Past Psychiatric History:  Depression: No  Anxiety: No  Psychosis: No    Medications:    Current Outpatient Prescriptions:     albuterol (VENTOLIN HFA) 90 mcg/act inhaler, Inhale 4 puffs every 6 (six) hours as needed, Disp: , Rfl:     amLODIPine (NORVASC) 5 mg tablet, Take 1 tablet by mouth daily, Disp: , Rfl:     aspirin 81 MG tablet, Take 1 tablet by mouth daily, Disp: , Rfl:     Cholecalciferol (VITAMIN D3) 5000 UNIT/ML LIQD, Take 1 mL by mouth daily, Disp: , Rfl:     Cyanocobalamin 1000 MCG/ML KIT, Inject as directed, Disp: , Rfl:     fluticasone (FLONASE) 50 mcg/act nasal spray, 2 Sprays in each nostril once a day, Disp: , Rfl:     FLUZONE HIGH-DOSE 0 5 ML DARSHANA, Use as directed, Disp: , Rfl: 0    levothyroxine 88 mcg tablet, Take 1 tablet by mouth daily, Disp: , Rfl:     mometasone-formoterol (DULERA) 200-5 MCG/ACT inhaler, Use as directed, Disp: , Rfl:     PHENobarbital 30 mg tablet, Take 3 tablets (90 mg total) by mouth daily at bedtime, Disp: 270 tablet, Rfl: 1    ramipril (ALTACE) 10 MG capsule, Take 1 capsule by mouth daily, Disp: , Rfl:     topiramate (TOPAMAX) 25 mg tablet, Take 2 tablets (50 mg total) by mouth daily at bedtime, Disp: 180 tablet, Rfl: 3    Allergies:   Allergies   Allergen Reactions    Pollen Extract        Family history:  Family History   Problem Relation Age of Onset    Other Father         pacemaker    Hypertension Mother    Zack Wrightors Hypertension Brother      There is no family history of seizure, epilepsy or developmental delay  Father may have been demented    Social History  Living situation:  lives with wife  Tobacco:  stopped smoking 20-30 years ago   Alcohol:  No alcohol use for the 30 years ago  Drugs:  No illegal drug use  Work:  retired, , 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  Driving:  Yes   reports that he has quit smoking  He has never used smokeless tobacco  He reports that he does not drink alcohol or use drugs  Review of Systems  A review of at least 12 organ/systems was obtained by the medical assistant and reviewed by me, including additional positives/negatives: HENT: Positive for hearing loss  Negative for tinnitus, trouble swallowing and voice change  Gastrointestinal: Positive for abdominal pain  Negative for nausea and vomiting  Musculoskeletal: Positive for myalgias (due to hernia)  Negative for neck pain  Decision making was of high-complexity due to the patient's high risk condition (seizures), psychiatric and neuropsychological comorbidities, behavioral problems, memory and cognitive problems and medication side effects  The total amount of time spent with the patient was 35 minutes  More than 50% of this time was devoted to counseling and coordination of care  Issues addressed during this clinic visit included overall management, medication counseling or monitoring (including adverse effects, side effects and risks of antiepileptic medications)     Start time: 2:20PM  End time: 2:55PM

## 2018-12-13 NOTE — PATIENT INSTRUCTIONS
PLAN:  - continue with phenobarbital 90mg at bedtime  - continue topiramate 25mg tab take 2 tabs at bedtime (may try taking one tab at bedtime)  - check phenobarbital level, vitamin D, CBC, CMP, fasting lipid profile  - continue with ASA 81mg, see PCP about blood pressure and LDL risk profile  - continue with vitamin D supplementation 5000unit/mL take 1 mL daily  - bilateral carotid dopplers for carotid bruits (will communicate findings by phone, if significantly stenotic will refer to vascular surgeon)    - follow-up in 12 months

## 2019-03-03 DIAGNOSIS — R56.9 GENERALIZED CONVULSIVE SEIZURE (HCC): ICD-10-CM

## 2019-03-04 RX ORDER — PHENOBARBITAL 30 MG/1
TABLET ORAL
Qty: 270 TABLET | Refills: 1 | Status: SHIPPED | OUTPATIENT
Start: 2019-03-04 | End: 2019-08-28 | Stop reason: SDUPTHER

## 2019-03-04 NOTE — TELEPHONE ENCOUNTER
The [place] was queried  No red flags were identified  The patient is low risk for abuse of the prescribed phenobarbital medication  Safe to proceed with prescription

## 2019-04-16 ENCOUNTER — TELEPHONE (OUTPATIENT)
Dept: NEUROLOGY | Facility: CLINIC | Age: 84
End: 2019-04-16

## 2019-08-28 DIAGNOSIS — R56.9 GENERALIZED CONVULSIVE SEIZURE (HCC): ICD-10-CM

## 2019-08-29 RX ORDER — TOPIRAMATE 25 MG/1
TABLET ORAL
Qty: 180 TABLET | Refills: 0 | Status: SHIPPED | OUTPATIENT
Start: 2019-08-29 | End: 2019-10-17 | Stop reason: SDUPTHER

## 2019-08-29 RX ORDER — PHENOBARBITAL 30 MG/1
TABLET ORAL
Qty: 270 TABLET | Refills: 0 | Status: SHIPPED | OUTPATIENT
Start: 2019-08-29 | End: 2019-10-17 | Stop reason: SDUPTHER

## 2019-09-06 ENCOUNTER — OFFICE VISIT (OUTPATIENT)
Dept: URGENT CARE | Facility: CLINIC | Age: 84
End: 2019-09-06
Payer: MEDICARE

## 2019-09-06 VITALS
TEMPERATURE: 98.9 F | OXYGEN SATURATION: 93 % | HEART RATE: 63 BPM | BODY MASS INDEX: 16.67 KG/M2 | SYSTOLIC BLOOD PRESSURE: 140 MMHG | DIASTOLIC BLOOD PRESSURE: 62 MMHG | HEIGHT: 68 IN | RESPIRATION RATE: 18 BRPM | WEIGHT: 110 LBS

## 2019-09-06 DIAGNOSIS — M79.89 LEG SWELLING: Primary | ICD-10-CM

## 2019-09-06 PROCEDURE — G0463 HOSPITAL OUTPT CLINIC VISIT: HCPCS | Performed by: PHYSICIAN ASSISTANT

## 2019-09-06 PROCEDURE — 99213 OFFICE O/P EST LOW 20 MIN: CPT | Performed by: PHYSICIAN ASSISTANT

## 2019-09-06 NOTE — PATIENT INSTRUCTIONS
1  Bilateral leg edema  -likely related to salt intake  -keep legs elevated as much as possible  -Recommend patient have bloodwork and further work-up performed at this time   Unfortunately we do not do bloodwork here therefore I recommend that he go to the ER for further evaluation and management

## 2019-09-06 NOTE — PROGRESS NOTES
[id number]        NAME: Daija Paez is a 81 y o  male  : 1935    MRN: [id number]  DATE: September 10, 2019  TIME: 11:55 AM    Assessment and Plan   Leg swelling [M79 89]  1  Leg swelling           Patient Instructions     1  Bilateral leg edema  -likely related to salt intake  -keep legs elevated as much as possible  -Recommend patient have bloodwork and further work-up performed at this time  Unfortunately we do not do bloodwork here therefore I recommend that he go to the ER for further evaluation and management    Chief Complaint     Chief Complaint   Patient presents with    Joint Swelling     b/l ankle pitting edema, pt states it started 3-4 days ago         History of Present Illness       The patient is an 81year old male with a PMH of COPD and HTN who presents today for an evaluation of swollen ankles for the past 3-4 days  The patient states that they feel "tight " The patient states that he has been eating a lot of TV dinners over the past couple of weeks because his wife was in a nursing home  No change in medications  No fever  Review of Systems   Review of Systems   Constitutional: Negative for chills and fever  Respiratory: Negative for shortness of breath  Cardiovascular: Positive for leg swelling  Negative for chest pain  Gastrointestinal: Negative for abdominal pain  Musculoskeletal: Negative for arthralgias  Neurological: Negative for headaches  All other systems reviewed and are negative          Current Medications       Current Outpatient Medications:     Cholecalciferol (VITAMIN D3) 1000 UNIT/SPRAY LIQD, Take 1 mL by mouth, Disp: , Rfl:     Cyanocobalamin 1000 MCG/ML KIT, Inject 1,000 mcg under the skin every 30 (thirty) days, Disp: , Rfl:     albuterol (VENTOLIN HFA) 90 mcg/act inhaler, Inhale 4 puffs every 6 (six) hours as needed, Disp: , Rfl:     amLODIPine (NORVASC) 5 mg tablet, Take 1 tablet by mouth daily, Disp: , Rfl:     aspirin 81 MG tablet, Take 1 tablet by mouth daily, Disp: , Rfl:     Cholecalciferol (VITAMIN D3) 5000 UNIT/ML LIQD, Take 1 mL by mouth daily, Disp: , Rfl:     Cyanocobalamin 1000 MCG/ML KIT, Inject as directed, Disp: , Rfl:     fluticasone (FLONASE) 50 mcg/act nasal spray, 2 Sprays in each nostril once a day, Disp: , Rfl:     FLUZONE HIGH-DOSE 0 5 ML DARSHANA, Use as directed, Disp: , Rfl: 0    levothyroxine 88 mcg tablet, Take 1 tablet by mouth daily, Disp: , Rfl:     PHENobarbital 30 mg tablet, TAKE 3 TABLETS BY MOUTH EVERY NIGHT AT BEDTIME, Disp: 270 tablet, Rfl: 0    ramipril (ALTACE) 10 MG capsule, Take 1 capsule by mouth daily, Disp: , Rfl:     topiramate (TOPAMAX) 25 mg tablet, TAKE 2 TABLETS BY MOUTH DAILY AT BEDTIME, Disp: 180 tablet, Rfl: 0    Current Allergies     Allergies as of 09/06/2019 - Reviewed 09/06/2019   Allergen Reaction Noted    Pollen extract  12/13/2018            The following portions of the patient's history were reviewed and updated as appropriate: allergies, current medications, past family history, past medical history, past social history, past surgical history and problem list      Past Medical History:   Diagnosis Date    Anxiety     COPD [facility]chronic obstructive pulmonary disease) ([facility] [address] )     Hypertension     Lung disease     Seizures ([facility] [address] )     Thyroid disorder        Past Surgical History:   Procedure Laterality Date    HERNIA REPAIR Left 2009    TONSILECTOMY AND ADNOIDECTOMY         Family History   Problem Relation Age of Onset    Other Father         pacemaker    Hypertension Mother     Hypertension Brother          Medications have been verified  Objective   /62   Pulse 63   Temp 98 9 °F (37 2 °C) (Temporal)   Resp 18   Ht 5' 8" (1 727 m)   Wt 49 9 kg (110 lb)   SpO2 93%   BMI 16 73 kg/m²        Physical Exam     Physical Exam   Constitutional: He is oriented to person, place, and time  He appears well-developed and well-nourished  No distress     Cardiovascular: Normal rate, regular rhythm and normal heart sounds  Pulmonary/Chest: Effort normal  He has decreased breath sounds in the right lower field and the left lower field  He has no wheezes  He has no rales  Musculoskeletal: He exhibits edema (1+ pitting edema bilaterally)  Neurological: He is alert and oriented to person, place, and time  Skin: Skin is warm and dry  No rash noted  Psychiatric: He has a normal mood and affect  Nursing note and vitals reviewed

## 2019-10-17 ENCOUNTER — TELEPHONE (OUTPATIENT)
Dept: NEUROLOGY | Facility: AMBULATORY SURGERY CENTER | Age: 84
End: 2019-10-17

## 2019-10-17 DIAGNOSIS — R56.9 GENERALIZED CONVULSIVE SEIZURE (HCC): ICD-10-CM

## 2019-10-18 RX ORDER — TOPIRAMATE 25 MG/1
TABLET ORAL
Qty: 180 TABLET | Refills: 0 | Status: SHIPPED | OUTPATIENT
Start: 2019-10-18

## 2019-10-18 RX ORDER — PHENOBARBITAL 30 MG/1
TABLET ORAL
Qty: 270 TABLET | Refills: 0 | Status: SHIPPED | OUTPATIENT
Start: 2019-10-18

## 2019-10-18 NOTE — TELEPHONE ENCOUNTER
Signed refill requests  Appears patient is due for f/u with Dr Dewayne Bryan, but there is no visit currently on the schedule  Please offer patient f/u

## 2020-02-07 ENCOUNTER — TELEPHONE (OUTPATIENT)
Dept: NEUROLOGY | Facility: CLINIC | Age: 85
End: 2020-02-07

## 2020-02-07 NOTE — TELEPHONE ENCOUNTER
Called patient to schedule 1 year FU with Dr Tenisha Ochoa, patient ls 12/2018  Patient currently driving told him he could call our number back at his convenience    If patient calls back please assist in scheduling follow up